# Patient Record
Sex: FEMALE | Race: WHITE | Employment: FULL TIME | URBAN - METROPOLITAN AREA
[De-identification: names, ages, dates, MRNs, and addresses within clinical notes are randomized per-mention and may not be internally consistent; named-entity substitution may affect disease eponyms.]

---

## 2019-08-20 ENCOUNTER — HOSPITAL ENCOUNTER (OUTPATIENT)
Dept: ENDOCRINOLOGY | Facility: HOSPITAL | Age: 49
Discharge: HOME | End: 2019-08-20
Attending: INTERNAL MEDICINE
Payer: COMMERCIAL

## 2019-08-20 DIAGNOSIS — E11.9 TYPE 2 DIABETES MELLITUS (HCC): Primary | ICD-10-CM

## 2019-08-20 DIAGNOSIS — E11.65 TYPE 2 DIABETES MELLITUS WITH HYPERGLYCEMIA, UNSPECIFIED WHETHER LONG TERM INSULIN USE (HCC): ICD-10-CM

## 2019-08-20 NOTE — PROGRESS NOTES
Jimmy Hazard  : 1970 was seen for Insulin Pump Start Education/ Individual:    Date: 2019   Start time: 1630  End time: 1800      Insulin Pump Brand: Medtronic [id number] (pt had to switch as her current pump, the [facility], was discon

## 2021-02-15 PROCEDURE — 88305 TISSUE EXAM BY PATHOLOGIST: CPT | Performed by: INTERNAL MEDICINE

## 2024-10-07 DIAGNOSIS — M81.0 OSTEOPOROSIS: Primary | ICD-10-CM

## 2024-10-11 ENCOUNTER — HOSPITAL ENCOUNTER (OUTPATIENT)
Dept: CT IMAGING | Age: 54
End: 2024-10-11
Attending: INTERNAL MEDICINE

## 2024-10-11 DIAGNOSIS — M81.0 OSTEOPOROSIS: ICD-10-CM

## 2024-10-11 PROCEDURE — 77080 DXA BONE DENSITY AXIAL: CPT
